# Patient Record
Sex: FEMALE | URBAN - METROPOLITAN AREA
[De-identification: names, ages, dates, MRNs, and addresses within clinical notes are randomized per-mention and may not be internally consistent; named-entity substitution may affect disease eponyms.]

---

## 2017-07-26 ENCOUNTER — APPOINTMENT (RX ONLY)
Dept: URBAN - METROPOLITAN AREA CLINIC 96 | Facility: CLINIC | Age: 62
Setting detail: DERMATOLOGY
End: 2017-07-26

## 2017-07-26 VITALS — HEIGHT: 61 IN | WEIGHT: 135 LBS

## 2017-07-26 DIAGNOSIS — L20.89 OTHER ATOPIC DERMATITIS: ICD-10-CM

## 2017-07-26 DIAGNOSIS — L259 CONTACT DERMATITIS AND OTHER ECZEMA, UNSPECIFIED CAUSE: ICD-10-CM

## 2017-07-26 PROBLEM — L23.9 ALLERGIC CONTACT DERMATITIS, UNSPECIFIED CAUSE: Status: ACTIVE | Noted: 2017-07-26

## 2017-07-26 PROBLEM — L20.84 INTRINSIC (ALLERGIC) ECZEMA: Status: ACTIVE | Noted: 2017-07-26

## 2017-07-26 PROBLEM — F32.9 MAJOR DEPRESSIVE DISORDER, SINGLE EPISODE, UNSPECIFIED: Status: ACTIVE | Noted: 2017-07-26

## 2017-07-26 PROCEDURE — 99203 OFFICE O/P NEW LOW 30 MIN: CPT

## 2017-07-26 PROCEDURE — ? COUNSELING

## 2017-07-26 PROCEDURE — ? PRESCRIPTION

## 2017-07-26 RX ORDER — TRIAMCINOLONE ACETONIDE 1 MG/G
CREAM TOPICAL
Qty: 1 | Refills: 1 | Status: ERX | COMMUNITY
Start: 2017-07-26

## 2017-07-26 RX ORDER — PREDNISONE 10 MG/1
TABLET ORAL
Qty: 15 | Refills: 0 | Status: ERX | COMMUNITY
Start: 2017-07-26

## 2017-07-26 RX ADMIN — TRIAMCINOLONE ACETONIDE: 1 CREAM TOPICAL at 15:48

## 2017-07-26 RX ADMIN — PREDNISONE: 10 TABLET ORAL at 15:48

## 2017-09-07 ENCOUNTER — APPOINTMENT (RX ONLY)
Dept: URBAN - METROPOLITAN AREA CLINIC 96 | Facility: CLINIC | Age: 62
Setting detail: DERMATOLOGY
End: 2017-09-07

## 2017-09-07 VITALS — HEIGHT: 61 IN | WEIGHT: 137 LBS

## 2017-09-07 DIAGNOSIS — L30.9 DERMATITIS, UNSPECIFIED: ICD-10-CM

## 2017-09-07 PROBLEM — L20.84 INTRINSIC (ALLERGIC) ECZEMA: Status: ACTIVE | Noted: 2017-09-07

## 2017-09-07 PROCEDURE — ? BIOPSY BY PUNCH METHOD

## 2017-09-07 PROCEDURE — ? PRESCRIPTION

## 2017-09-07 PROCEDURE — ? COUNSELING

## 2017-09-07 PROCEDURE — 11100: CPT

## 2017-09-07 RX ORDER — HYDROXYZINE HYDROCHLORIDE 10 MG/1
TABLET, FILM COATED ORAL
Qty: 60 | Refills: 2 | Status: ERX | COMMUNITY
Start: 2017-09-07

## 2017-09-07 RX ORDER — TRIAMCINOLONE ACETONIDE 1 MG/G
CREAM TOPICAL BID
Qty: 1 | Refills: 3 | Status: ERX

## 2017-09-07 RX ADMIN — HYDROXYZINE HYDROCHLORIDE: 10 TABLET, FILM COATED ORAL at 18:17

## 2017-09-07 ASSESSMENT — LOCATION SIMPLE DESCRIPTION DERM: LOCATION SIMPLE: RIGHT UPPER BACK

## 2017-09-07 ASSESSMENT — LOCATION DETAILED DESCRIPTION DERM: LOCATION DETAILED: RIGHT MID-UPPER BACK

## 2017-09-07 ASSESSMENT — LOCATION ZONE DERM: LOCATION ZONE: TRUNK

## 2017-09-07 NOTE — PROCEDURE: MIPS QUALITY
Detail Level: Generalized
Quality 431: Preventive Care And Screening: Unhealthy Alcohol Use - Screening: Patient screened for unhealthy alcohol use using a single question and scores less than 2 times per year
Quality 130: Documentation Of Current Medications In The Medical Record: Current Medications Documented
Quality 402: Tobacco Use And Help With Quitting Among Adolescents: Patient screened for tobacco and never smoked

## 2017-09-07 NOTE — PROCEDURE: BIOPSY BY PUNCH METHOD
Bill [id number] For Specimen Handling/Conveyance To Laboratory?: no
Epidermal Sutures: 4-0 Ethilon
Home Suture Removal Text: Patient was provided a home suture removal kit and will remove their sutures at home.  If they have any questions or difficulties they will call the office.
Detail Level: Detailed
Suture Removal: 14 days
Anesthesia Volume In Cc: 0.5
Dressing: bandage
Wound Care: Bacitracin
Post-Care Instructions: I reviewed with the patient in detail post-care instructions. Patient is to keep the biopsy site dry overnight, and then apply bacitracin twice daily until healed. Patient may apply hydrogen peroxide soaks to remove any crusting.
X Depth Of Punch In Cm (Optional): 0
Billing Type: Third-Party Bill
Anesthesia Type: 1% lidocaine with epinephrine
Punch Size In Mm: 4
Biopsy Type: H and E
Hemostasis: None
Notification Instructions: Patient will be notified of biopsy results. However, patient instructed to call the office if not contacted within 2 weeks.
Consent: Written consent was obtained and risks were reviewed including but not limited to scarring, infection, bleeding, scabbing, incomplete removal, nerve damage and allergy to anesthesia.

## 2017-09-15 ENCOUNTER — RX ONLY (OUTPATIENT)
Age: 62
Setting detail: RX ONLY
End: 2017-09-15

## 2017-09-15 RX ORDER — TRIAMCINOLONE ACETONIDE 1 MG/G
CREAM TOPICAL
Qty: 1 | Refills: 0 | Status: ERX

## 2017-09-15 RX ORDER — PREDNISONE 10 MG/1
TABLET ORAL
Qty: 21 | Refills: 0 | Status: ERX